# Patient Record
Sex: FEMALE
[De-identification: names, ages, dates, MRNs, and addresses within clinical notes are randomized per-mention and may not be internally consistent; named-entity substitution may affect disease eponyms.]

---

## 2024-05-02 PROBLEM — Z00.00 ENCOUNTER FOR PREVENTIVE HEALTH EXAMINATION: Status: ACTIVE | Noted: 2024-05-02

## 2024-05-06 ENCOUNTER — APPOINTMENT (OUTPATIENT)
Dept: ORTHOPEDIC SURGERY | Facility: CLINIC | Age: 74
End: 2024-05-06
Payer: MEDICARE

## 2024-05-06 ENCOUNTER — OUTPATIENT (OUTPATIENT)
Dept: OUTPATIENT SERVICES | Facility: HOSPITAL | Age: 74
LOS: 1 days | End: 2024-05-06
Payer: MEDICARE

## 2024-05-06 ENCOUNTER — RESULT REVIEW (OUTPATIENT)
Age: 74
End: 2024-05-06

## 2024-05-06 VITALS
OXYGEN SATURATION: 97 % | WEIGHT: 178 LBS | TEMPERATURE: 97.8 F | SYSTOLIC BLOOD PRESSURE: 128 MMHG | HEART RATE: 69 BPM | BODY MASS INDEX: 32.76 KG/M2 | DIASTOLIC BLOOD PRESSURE: 84 MMHG | HEIGHT: 62 IN

## 2024-05-06 DIAGNOSIS — M19.011 PRIMARY OSTEOARTHRITIS, RIGHT SHOULDER: ICD-10-CM

## 2024-05-06 DIAGNOSIS — M67.911 UNSPECIFIED DISORDER OF SYNOVIUM AND TENDON, RIGHT SHOULDER: ICD-10-CM

## 2024-05-06 PROCEDURE — 73030 X-RAY EXAM OF SHOULDER: CPT | Mod: 26,LT,RT

## 2024-05-06 PROCEDURE — 73030 X-RAY EXAM OF SHOULDER: CPT | Mod: LT,RT

## 2024-05-06 PROCEDURE — 73030 X-RAY EXAM OF SHOULDER: CPT

## 2024-05-06 PROCEDURE — 99203 OFFICE O/P NEW LOW 30 MIN: CPT | Mod: 25

## 2024-05-06 RX ORDER — MELOXICAM 7.5 MG/1
7.5 TABLET ORAL
Qty: 30 | Refills: 1 | Status: ACTIVE | COMMUNITY
Start: 2024-05-06 | End: 1900-01-01

## 2024-05-06 NOTE — END OF VISIT
[FreeTextEntry3] :   All medical record entries made by the Scribe were at my, Dr. Juliana Amador's, direction and personally dictated by me on 05/06/2024. I have reviewed the chart and agree that the record accurately reflects my personal performance of the history, physical exam, assessment and plan. I have also personally directed, reviewed, and agreed with the chart.

## 2024-05-06 NOTE — REASON FOR VISIT
[Initial Visit] : an initial visit for [[employer] ] : provided by [employer]   [FreeTextEntry2] : right shoulder pain [Interpreters_IDNumber] : [id number] [Interpreters_FullName] : Arvind [TWNoteComboBox1] : [place]

## 2024-05-06 NOTE — ASSESSMENT
[FreeTextEntry1] : ANTHONY GERONIMO is a 74 year old female with right shoulder pain. I discussed with the patient that their symptoms, signs, and imaging are most consistent with rotator cuff tendinopathy and osteoarthritis. We reviewed the natural history of this condition and treatment options. We agreed on the following plan:    XR taken and reviewed with patient today. Activity modification Start Home Exercises for shoulder stretching and strengthening (Neer protocol). Demonstration, resistance band and handout provided. Physical therapy. Referral provided. Medication: meloxicam 7.5-15mg daily PRN, prescription provided. Advanced imaging: consider MRI if no symptomatic improvement. Consider US guided CSI if symptoms worsen. Follow up in 6 weeks.

## 2024-05-06 NOTE — ADDENDUM
[FreeTextEntry1] :   I, Matias Vides, acted as a scribe on behalf of Dr. Amador 05/06/2024.
Detail Level: Detailed
Initiate Treatment: Tretinoin gel
Plan: Will plan for destruction of lesions next visit due to lesions not responding to topical medications in the past. Will plan to treat with destruction for the next 5 visits or until lesions have resolved

## 2024-05-06 NOTE — PHYSICAL EXAM
[de-identified] : General: Well-nourished, well-developed, alert, and in no acute distress. Head: Normocephalic. Eyes: Pupils equal, extraocular muscles intact, normal sclera. Nose: No nasal discharge. Cardiovascular: Extremities are warm and well perfused. Distal pulses are symmetric bilaterally. Respiratory: No labored breathing. Extremities: Sensation is intact distally bilaterally. Distal pulses are symmetric bilaterally Lymphatic: No regional lymphadenopathy, no lymphedema Neurologic: No focal deficits Skin: Normal skin color, texture, and turgor Psychiatric: Normal affect   MSK:   C-spine demonstrates normal lordosis, no tenderness to palpation midline, paraspinals, AROM Cervical facet loading negative Spurling's Compression negative Tender to palpation right upper traps. Non tender to palpation midline and paraspinals.   Examination of right shoulder shows no overlying skin changes or muscular atrophy. There is tenderness to palpation over the AC joint, Bicipital groove, Trapezius   Range of motion shows forward elevation of [140], abduction [120], external rotation [50], and internal rotation to the [lumbar area].  There is pain at the end range of motion.   Special Tests: Painful Arc [positive] Neer's [positive] Hawkin's [positive] Nicky's positive Resisted ext rotation positive Lift-Off [positive] Saxton positive Speed's positive   Examination of left shoulder shows no overlying skin changes or muscular atrophy. There is no tenderness to palpation over the AC joint, Bicipital groove, Trapezius   Range of motion shows forward elevation of [160], abduction [160], external rotation [60], and internal rotation to the [lumbar spine].  There is no pain at the end range of motion.   Special Tests: Painful Arc negative Neer's negative Hawkin's negative Nicky's negative Resisted ext rotation negative Lift-Off negative Saxton negative Speed's negative   Sensation is intact to light touch over the axillary, musculocutaneous, median, radial, and ulnar nerve distributions bilaterally. Capillary refill is less than two seconds. Radial pulses 2+ equal bilaterally. Strength testing shows 5/5 abduction, 5/5 external rotation, 5/5 internal rotation, 5/5 biceps, 5/5 triceps. 5/5 wrist extension, 5/5 intrinsics. Reflexes 2+ biceps, brachioradialis. Hand negative.

## 2024-05-06 NOTE — HISTORY OF PRESENT ILLNESS
[de-identified] : ANTHONY GERONIMO is a 74 year old female presents with right shoulder pain. Pt states the pain is exacerbated when lifting her arm. Pt localizes the pain to her trapezius area. Pt states she's had the pain for a long time but thought it was arthritis. Pt states it's been about 7 months that she's had this pain but starting 3 months ago is when it started getting worse. Her PCP recommended therapy but she wanted to see you first. Pt is right handed. Pt is not taking anything for pain at the moment, she states she doesn't really like taking pain medication. 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 Pt states the middle finger in the right hand can get numb sometimes and stay bent and hurt a lot to straighten out. No neck pain. Pt had done 3 sessions of physical therapy but felt that the E-STEM aggravated her pain, so she did not return.    from [employer] Name:  Arvind  ID#: [id number]

## 2024-06-17 ENCOUNTER — APPOINTMENT (OUTPATIENT)
Dept: ORTHOPEDIC SURGERY | Facility: CLINIC | Age: 74
End: 2024-06-17

## 2024-07-18 ENCOUNTER — NON-APPOINTMENT (OUTPATIENT)
Age: 74
End: 2024-07-18

## 2024-07-19 ENCOUNTER — APPOINTMENT (OUTPATIENT)
Dept: UROLOGY | Facility: CLINIC | Age: 74
End: 2024-07-19
Payer: MEDICARE

## 2024-07-19 VITALS
BODY MASS INDEX: 32.76 KG/M2 | HEIGHT: 62 IN | DIASTOLIC BLOOD PRESSURE: 77 MMHG | TEMPERATURE: 97.9 F | SYSTOLIC BLOOD PRESSURE: 126 MMHG | WEIGHT: 178 LBS | HEART RATE: 68 BPM

## 2024-07-19 DIAGNOSIS — N28.1 CYST OF KIDNEY, ACQUIRED: ICD-10-CM

## 2024-07-19 PROCEDURE — 99204 OFFICE O/P NEW MOD 45 MIN: CPT

## 2024-07-19 PROCEDURE — G2211 COMPLEX E/M VISIT ADD ON: CPT

## 2025-03-21 ENCOUNTER — APPOINTMENT (OUTPATIENT)
Dept: UROLOGY | Facility: CLINIC | Age: 75
End: 2025-03-21

## 2025-05-15 ENCOUNTER — OUTPATIENT (OUTPATIENT)
Dept: OUTPATIENT SERVICES | Facility: HOSPITAL | Age: 75
LOS: 1 days | End: 2025-05-15
Payer: MEDICARE

## 2025-05-15 ENCOUNTER — RESULT REVIEW (OUTPATIENT)
Age: 75
End: 2025-05-15

## 2025-05-15 ENCOUNTER — APPOINTMENT (OUTPATIENT)
Dept: CT IMAGING | Facility: HOSPITAL | Age: 75
End: 2025-05-15

## 2025-05-15 PROCEDURE — 82565 ASSAY OF CREATININE: CPT

## 2025-05-15 PROCEDURE — 74170 CT ABD WO CNTRST FLWD CNTRST: CPT

## 2025-05-15 PROCEDURE — 74170 CT ABD WO CNTRST FLWD CNTRST: CPT | Mod: 26

## 2025-05-16 ENCOUNTER — APPOINTMENT (OUTPATIENT)
Dept: UROLOGY | Facility: CLINIC | Age: 75
End: 2025-05-16
Payer: MEDICARE

## 2025-05-16 VITALS
WEIGHT: 178 LBS | HEART RATE: 80 BPM | TEMPERATURE: 97.4 F | DIASTOLIC BLOOD PRESSURE: 84 MMHG | SYSTOLIC BLOOD PRESSURE: 145 MMHG | HEIGHT: 62 IN | BODY MASS INDEX: 32.76 KG/M2

## 2025-05-16 DIAGNOSIS — N28.1 CYST OF KIDNEY, ACQUIRED: ICD-10-CM

## 2025-05-16 PROCEDURE — 99213 OFFICE O/P EST LOW 20 MIN: CPT

## 2025-05-19 LAB
APPEARANCE: CLEAR
BACTERIA UR CULT: NORMAL
BACTERIA: NEGATIVE /HPF
BILIRUBIN URINE: NEGATIVE
BLOOD URINE: NEGATIVE
CAST: 0 /LPF
COLOR: YELLOW
EPITHELIAL CELLS: 1 /HPF
GLUCOSE QUALITATIVE U: NEGATIVE MG/DL
KETONES URINE: NEGATIVE MG/DL
LEUKOCYTE ESTERASE URINE: NEGATIVE
MICROSCOPIC-UA: NORMAL
NITRITE URINE: NEGATIVE
PH URINE: 5.5
PROTEIN URINE: NORMAL MG/DL
RED BLOOD CELLS URINE: 1 /HPF
SPECIFIC GRAVITY URINE: 1.03
UROBILINOGEN URINE: 0.2 MG/DL
WHITE BLOOD CELLS URINE: 0 /HPF

## 2025-05-20 ENCOUNTER — NON-APPOINTMENT (OUTPATIENT)
Age: 75
End: 2025-05-20

## 2025-05-27 ENCOUNTER — RESULT REVIEW (OUTPATIENT)
Age: 75
End: 2025-05-27

## 2025-05-27 ENCOUNTER — OUTPATIENT (OUTPATIENT)
Dept: OUTPATIENT SERVICES | Facility: HOSPITAL | Age: 75
LOS: 1 days | End: 2025-05-27
Payer: MEDICARE

## 2025-05-27 ENCOUNTER — APPOINTMENT (OUTPATIENT)
Dept: ORTHOPEDIC SURGERY | Facility: CLINIC | Age: 75
End: 2025-05-27
Payer: MEDICARE

## 2025-05-27 VITALS
HEART RATE: 71 BPM | WEIGHT: 178 LBS | SYSTOLIC BLOOD PRESSURE: 139 MMHG | TEMPERATURE: 98 F | OXYGEN SATURATION: 98 % | BODY MASS INDEX: 32.76 KG/M2 | HEIGHT: 62 IN | DIASTOLIC BLOOD PRESSURE: 81 MMHG

## 2025-05-27 DIAGNOSIS — Z96.659 PRESENCE OF UNSPECIFIED ARTIFICIAL KNEE JOINT: ICD-10-CM

## 2025-05-27 DIAGNOSIS — Z96.653 AFTERCARE FOLLOWING JOINT REPLACEMENT SURGERY: ICD-10-CM

## 2025-05-27 DIAGNOSIS — Z86.39 PERSONAL HISTORY OF OTHER ENDOCRINE, NUTRITIONAL AND METABOLIC DISEASE: ICD-10-CM

## 2025-05-27 DIAGNOSIS — Z78.9 OTHER SPECIFIED HEALTH STATUS: ICD-10-CM

## 2025-05-27 DIAGNOSIS — M54.16 RADICULOPATHY, LUMBAR REGION: ICD-10-CM

## 2025-05-27 DIAGNOSIS — Z72.3 LACK OF PHYSICAL EXERCISE: ICD-10-CM

## 2025-05-27 DIAGNOSIS — Z47.1 AFTERCARE FOLLOWING JOINT REPLACEMENT SURGERY: ICD-10-CM

## 2025-05-27 PROCEDURE — 99214 OFFICE O/P EST MOD 30 MIN: CPT

## 2025-05-27 PROCEDURE — 73521 X-RAY EXAM HIPS BI 2 VIEWS: CPT | Mod: 26

## 2025-05-27 PROCEDURE — 73521 X-RAY EXAM HIPS BI 2 VIEWS: CPT

## 2025-05-27 PROCEDURE — 73564 X-RAY EXAM KNEE 4 OR MORE: CPT

## 2025-05-27 PROCEDURE — 72020 X-RAY EXAM OF SPINE 1 VIEW: CPT

## 2025-05-27 PROCEDURE — 72020 X-RAY EXAM OF SPINE 1 VIEW: CPT | Mod: 26

## 2025-05-27 PROCEDURE — 73564 X-RAY EXAM KNEE 4 OR MORE: CPT | Mod: 26,LT,RT

## 2025-05-27 RX ORDER — METHYLPREDNISOLONE 4 MG/1
4 TABLET ORAL
Qty: 1 | Refills: 0 | Status: ACTIVE | COMMUNITY
Start: 2025-05-27 | End: 1900-01-01

## 2025-05-30 ENCOUNTER — APPOINTMENT (OUTPATIENT)
Dept: MRI IMAGING | Age: 75
End: 2025-05-30
Payer: MEDICARE

## 2025-05-30 PROCEDURE — 72148 MRI LUMBAR SPINE W/O DYE: CPT

## 2025-06-09 ENCOUNTER — APPOINTMENT (OUTPATIENT)
Dept: ORTHOPEDIC SURGERY | Facility: CLINIC | Age: 75
End: 2025-06-09
Payer: MEDICARE

## 2025-06-09 VITALS
BODY MASS INDEX: 32.76 KG/M2 | HEIGHT: 62 IN | DIASTOLIC BLOOD PRESSURE: 75 MMHG | HEART RATE: 79 BPM | SYSTOLIC BLOOD PRESSURE: 110 MMHG | OXYGEN SATURATION: 98 % | WEIGHT: 178 LBS

## 2025-06-09 PROCEDURE — 99215 OFFICE O/P EST HI 40 MIN: CPT
